# Patient Record
Sex: MALE | HISPANIC OR LATINO | Employment: FULL TIME | URBAN - NONMETROPOLITAN AREA
[De-identification: names, ages, dates, MRNs, and addresses within clinical notes are randomized per-mention and may not be internally consistent; named-entity substitution may affect disease eponyms.]

---

## 2021-05-11 ENCOUNTER — IMMUNIZATION (OUTPATIENT)
Dept: VACCINE CLINIC | Facility: HOSPITAL | Age: 51
End: 2021-05-11

## 2021-05-11 PROCEDURE — 91300 HC SARSCOV02 VAC 30MCG/0.3ML IM: CPT | Performed by: INTERNAL MEDICINE

## 2021-05-11 PROCEDURE — 0001A: CPT | Performed by: INTERNAL MEDICINE

## 2021-06-01 ENCOUNTER — IMMUNIZATION (OUTPATIENT)
Dept: VACCINE CLINIC | Facility: HOSPITAL | Age: 51
End: 2021-06-01

## 2021-06-01 PROCEDURE — 91300 HC SARSCOV02 VAC 30MCG/0.3ML IM: CPT | Performed by: INTERNAL MEDICINE

## 2021-06-01 PROCEDURE — 0002A: CPT | Performed by: INTERNAL MEDICINE

## 2021-10-27 ENCOUNTER — APPOINTMENT (OUTPATIENT)
Dept: GENERAL RADIOLOGY | Facility: HOSPITAL | Age: 51
End: 2021-10-27

## 2021-10-27 ENCOUNTER — HOSPITAL ENCOUNTER (EMERGENCY)
Facility: HOSPITAL | Age: 51
Discharge: HOME | End: 2021-10-27
Attending: EMERGENCY MEDICINE | Admitting: EMERGENCY MEDICINE

## 2021-10-27 VITALS
OXYGEN SATURATION: 98 % | SYSTOLIC BLOOD PRESSURE: 130 MMHG | DIASTOLIC BLOOD PRESSURE: 90 MMHG | RESPIRATION RATE: 16 BRPM | HEIGHT: 74 IN | BODY MASS INDEX: 29.26 KG/M2 | HEART RATE: 50 BPM | TEMPERATURE: 96.9 F | WEIGHT: 228 LBS

## 2021-10-27 DIAGNOSIS — R07.89 ATYPICAL CHEST PAIN: Primary | ICD-10-CM

## 2021-10-27 LAB
ALBUMIN SERPL-MCNC: 4.3 G/DL (ref 3.5–5.2)
ALBUMIN/GLOB SERPL: 1.7 G/DL
ALP SERPL-CCNC: 100 U/L (ref 39–117)
ALT SERPL W P-5'-P-CCNC: 21 U/L (ref 1–41)
ANION GAP SERPL CALCULATED.3IONS-SCNC: 7.9 MMOL/L (ref 5–15)
AST SERPL-CCNC: 24 U/L (ref 1–40)
BASOPHILS # BLD AUTO: 0.01 10*3/MM3 (ref 0–0.2)
BASOPHILS NFR BLD AUTO: 0.1 % (ref 0–1.5)
BILIRUB SERPL-MCNC: 0.3 MG/DL (ref 0–1.2)
BUN SERPL-MCNC: 13 MG/DL (ref 6–20)
BUN/CREAT SERPL: 10.2 (ref 7–25)
CALCIUM SPEC-SCNC: 8.9 MG/DL (ref 8.6–10.5)
CHLORIDE SERPL-SCNC: 107 MMOL/L (ref 98–107)
CO2 SERPL-SCNC: 26.1 MMOL/L (ref 22–29)
CREAT SERPL-MCNC: 1.28 MG/DL (ref 0.76–1.27)
D DIMER PPP FEU-MCNC: <0.27 MCGFEU/ML (ref 0–0.49)
DEPRECATED RDW RBC AUTO: 43.9 FL (ref 37–54)
EOSINOPHIL # BLD AUTO: 0.02 10*3/MM3 (ref 0–0.4)
EOSINOPHIL NFR BLD AUTO: 0.3 % (ref 0.3–6.2)
ERYTHROCYTE [DISTWIDTH] IN BLOOD BY AUTOMATED COUNT: 13.1 % (ref 12.3–15.4)
GFR SERPL CREATININE-BSD FRML MDRD: 59 ML/MIN/1.73
GFR SERPL CREATININE-BSD FRML MDRD: 72 ML/MIN/1.73
GLOBULIN UR ELPH-MCNC: 2.6 GM/DL
GLUCOSE SERPL-MCNC: 100 MG/DL (ref 65–99)
HCT VFR BLD AUTO: 43.8 % (ref 37.5–51)
HGB BLD-MCNC: 15.4 G/DL (ref 13–17.7)
IMM GRANULOCYTES # BLD AUTO: 0.01 10*3/MM3 (ref 0–0.05)
IMM GRANULOCYTES NFR BLD AUTO: 0.1 % (ref 0–0.5)
LIPASE SERPL-CCNC: 48 U/L (ref 13–60)
LYMPHOCYTES # BLD AUTO: 2.07 10*3/MM3 (ref 0.7–3.1)
LYMPHOCYTES NFR BLD AUTO: 30.2 % (ref 19.6–45.3)
MCH RBC QN AUTO: 32.1 PG (ref 26.6–33)
MCHC RBC AUTO-ENTMCNC: 35.2 G/DL (ref 31.5–35.7)
MCV RBC AUTO: 91.3 FL (ref 79–97)
MONOCYTES # BLD AUTO: 0.37 10*3/MM3 (ref 0.1–0.9)
MONOCYTES NFR BLD AUTO: 5.4 % (ref 5–12)
NEUTROPHILS NFR BLD AUTO: 4.37 10*3/MM3 (ref 1.7–7)
NEUTROPHILS NFR BLD AUTO: 63.9 % (ref 42.7–76)
NRBC BLD AUTO-RTO: 0 /100 WBC (ref 0–0.2)
PLATELET # BLD AUTO: 280 10*3/MM3 (ref 140–450)
PMV BLD AUTO: 10.9 FL (ref 6–12)
POTASSIUM SERPL-SCNC: 4.1 MMOL/L (ref 3.5–5.2)
PROT SERPL-MCNC: 6.9 G/DL (ref 6–8.5)
QT INTERVAL: 394 MS
RBC # BLD AUTO: 4.8 10*6/MM3 (ref 4.14–5.8)
SODIUM SERPL-SCNC: 141 MMOL/L (ref 136–145)
TROPONIN T SERPL-MCNC: <0.01 NG/ML (ref 0–0.03)
WBC # BLD AUTO: 6.85 10*3/MM3 (ref 3.4–10.8)

## 2021-10-27 PROCEDURE — 93005 ELECTROCARDIOGRAM TRACING: CPT | Performed by: EMERGENCY MEDICINE

## 2021-10-27 PROCEDURE — 85025 COMPLETE CBC W/AUTO DIFF WBC: CPT | Performed by: EMERGENCY MEDICINE

## 2021-10-27 PROCEDURE — 93010 ELECTROCARDIOGRAM REPORT: CPT | Performed by: INTERNAL MEDICINE

## 2021-10-27 PROCEDURE — 85379 FIBRIN DEGRADATION QUANT: CPT | Performed by: EMERGENCY MEDICINE

## 2021-10-27 PROCEDURE — 93005 ELECTROCARDIOGRAM TRACING: CPT

## 2021-10-27 PROCEDURE — 80053 COMPREHEN METABOLIC PANEL: CPT | Performed by: EMERGENCY MEDICINE

## 2021-10-27 PROCEDURE — 84484 ASSAY OF TROPONIN QUANT: CPT | Performed by: EMERGENCY MEDICINE

## 2021-10-27 PROCEDURE — 71045 X-RAY EXAM CHEST 1 VIEW: CPT

## 2021-10-27 PROCEDURE — 83690 ASSAY OF LIPASE: CPT | Performed by: EMERGENCY MEDICINE

## 2021-10-27 PROCEDURE — 99283 EMERGENCY DEPT VISIT LOW MDM: CPT

## 2021-10-27 RX ORDER — SODIUM CHLORIDE 0.9 % (FLUSH) 0.9 %
10 SYRINGE (ML) INJECTION AS NEEDED
Status: DISCONTINUED | OUTPATIENT
Start: 2021-10-27 | End: 2021-10-27 | Stop reason: HOSPADM

## 2024-01-18 ENCOUNTER — OFFICE VISIT (OUTPATIENT)
Dept: PRIMARY CARE | Facility: CLINIC | Age: 54
End: 2024-01-18
Payer: COMMERCIAL

## 2024-01-18 VITALS
OXYGEN SATURATION: 97 % | BODY MASS INDEX: 30.42 KG/M2 | TEMPERATURE: 97.3 F | HEIGHT: 74 IN | SYSTOLIC BLOOD PRESSURE: 122 MMHG | DIASTOLIC BLOOD PRESSURE: 86 MMHG | RESPIRATION RATE: 15 BRPM | HEART RATE: 63 BPM | WEIGHT: 237 LBS

## 2024-01-18 DIAGNOSIS — Z12.11 COLON CANCER SCREENING: ICD-10-CM

## 2024-01-18 DIAGNOSIS — Z13.220 LIPID SCREENING: ICD-10-CM

## 2024-01-18 DIAGNOSIS — N52.9 ERECTILE DYSFUNCTION, UNSPECIFIED ERECTILE DYSFUNCTION TYPE: ICD-10-CM

## 2024-01-18 DIAGNOSIS — R20.2 PARESTHESIA: ICD-10-CM

## 2024-01-18 DIAGNOSIS — Z00.00 ROUTINE GENERAL MEDICAL EXAMINATION AT A HEALTH CARE FACILITY: Primary | ICD-10-CM

## 2024-01-18 PROCEDURE — 99386 PREV VISIT NEW AGE 40-64: CPT | Performed by: STUDENT IN AN ORGANIZED HEALTH CARE EDUCATION/TRAINING PROGRAM

## 2024-01-18 PROCEDURE — 99203 OFFICE O/P NEW LOW 30 MIN: CPT | Mod: 25 | Performed by: STUDENT IN AN ORGANIZED HEALTH CARE EDUCATION/TRAINING PROGRAM

## 2024-01-18 PROCEDURE — 3008F BODY MASS INDEX DOCD: CPT | Performed by: STUDENT IN AN ORGANIZED HEALTH CARE EDUCATION/TRAINING PROGRAM

## 2024-01-18 ASSESSMENT — PAIN SCALES - GENERAL: PAINLEVEL: 0-NO PAIN

## 2024-01-18 ASSESSMENT — PATIENT HEALTH QUESTIONNAIRE - PHQ9: SUM OF ALL RESPONSES TO PHQ9 QUESTIONS 1 & 2: 0

## 2024-01-18 NOTE — PROGRESS NOTES
[facility]     CHIEF COMPLAINT   New Patient  Previous PCP: Dr. Bobby Yanez     HISTORY OF PRESENT ILLNESS      This is a 53 y.o. male who presents to establish care.  Today, patient primarily wishes to discuss erectile dysfunction.  He states that this began approximately 2 to 3 months ago.  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  He states that he previously was able to achieve nocturnal tumescence however this has since abated during the same period of time.  He states that he additionally has noted decreased libido over the last 1 to 2 months however does not believe this to be the primary issue, and rather secondary due to his erectile dysfunction.  He denies depression/anxiety.  Denies alcohol use.  He states he did try 1 pill of Viagra however this did not provide any benefit to him.    PAST MEDICAL AND SURGICAL HISTORY      History reviewed. No pertinent past medical history.    History reviewed. No pertinent surgical history.    MEDICATIONS      No current outpatient medications on file.    ALLERGIES      Patient has no known allergies.    FAMILY HISTORY      Family History   Problem Relation Age of Onset   • Hypertension Biological Mother    • No Known Problems Biological Father    • Prostate cancer Maternal Grandfather        SOCIAL HISTORY      Social History     Socioeconomic History   • Marital status:      Spouse name: None   • Number of children: None   • Years of education: None   • Highest education level: None   Occupational History   • Occupation:    Tobacco Use   • Smoking status: Never     Passive exposure: Never   • Smokeless tobacco: Never   Vaping Use   • Vaping Use: Never used   Substance and Sexual Activity   • Alcohol use: Never   • Drug use:  "Never   • Sexual activity: Yes     Partners: Female       REVIEW OF SYSTEMS      Review of Systems   All other systems reviewed and are negative.      PHYSICAL EXAMINATION      Visit Vitals  /86   Pulse 63   Temp 36.3 °C (97.3 °F) (Temporal)   Resp 15   Ht 1.88 m (6' 2\")   Wt 108 kg (237 lb) Comment: with sneakers on   SpO2 97%   BMI 30.43 kg/m²     Body mass index is 30.43 kg/m².    Physical Exam  Constitutional:       General: He is not in acute distress.     Appearance: He is well-developed. He is not toxic-appearing or diaphoretic.   HENT:      Head: Normocephalic and atraumatic.   Eyes:      Extraocular Movements: Extraocular movements intact.      Pupils: Pupils are equal, round, and reactive to light.   Cardiovascular:      Rate and Rhythm: Normal rate and regular rhythm.      Heart sounds: Normal heart sounds. No murmur heard.     No friction rub. No gallop.   Pulmonary:      Effort: Pulmonary effort is normal. No respiratory distress.      Breath sounds: Normal breath sounds. No wheezing or rales.   Musculoskeletal:         General: No deformity. Normal range of motion.      Cervical back: Normal range of motion and neck supple.      Right lower leg: No edema.      Left lower leg: No edema.   Skin:     General: Skin is warm and dry.      Coloration: Skin is not jaundiced.      Findings: No rash.   Neurological:      General: No focal deficit present.      Mental Status: He is alert and oriented to person, place, and time.   Psychiatric:         Mood and Affect: Mood normal.         Behavior: Behavior normal.         LABS / IMAGING / STUDIES        Labs    No results found for: \"CREATININE\"  No results found for: \"WBC\", \"HGB\", \"HCT\", \"MCV\", \"PLT\"      No results found for: \"HGBA1C\"  No results found for: \"MICROALBUR\", \"[id number]\"        HEALTH MAINTENANCE           Colonoscopy: Due, referred    Tdap: Counseled  Flu: Counseled  COVID-19: Counseled    Labs: Due, ordered    Dentist: UTD    Diet: " Balanced  Exercise: Regular       ASSESSMENT AND PLAN   Problem List Items Addressed This Visit        Genitourinary    Erectile dysfunction     Unclear if structural/lumbar/sacral impingement versus primary hormonal.  Will order blood work to further evaluate.  Will additionally order lumbar and sacral x-rays for further evaluation.         Relevant Orders    TSH w reflex FT4    Testosterone, total and free       Other    Routine general medical examination at a health care facility - Primary     Patient is up-to-date with age-appropriate cancer screenings and vaccinations unless otherwise noted above.  Counseled regarding importance of keeping up-to-date with vaccinations and cancer screenings as applicable.  Baseline/repeat/annual labs ordered.  RTO 1 year or sooner PRN.           Relevant Orders    CBC and differential    Comprehensive metabolic panel    Lipid panel    Hemoglobin A1c   Other Visit Diagnoses     Lipid screening        Relevant Orders    Lipid panel    Paresthesia        Relevant Orders    X-RAY LUMBAR SPINE COMPLETE 4+ VIEWS    X-RAY SACRUM AND COCCYX    Colon cancer screening        Relevant Orders    Ambulatory referral to Gastroenterology          Health Care Maintenance (discussed if applicable):    Patient encouraged to increase activity gradually as tolerated with a goal of 150 minutes of aerobic activity per week via AVS.     Counseled patient on healthy eating (high quality, low carb, low sugar diet) via AVS.     Advised patient to complete regular dental examinations, brushing and flossing daily via AVS.    Encouraged enhanced safety by wearing seat belts, checking smoke and CO detectors via AVS.            Brian Mart, DO  01/18/24        Some or all of this note has been written using voice recognition software.    Please excuse any typographical errors.    On date of encounter, 30 minutes were spent exclusively dedicated to this encounter including pre-visit chart review and  documentation, patient interview and physical examination, and post-visit documentation.

## 2024-01-18 NOTE — ASSESSMENT & PLAN NOTE
Unclear if structural/lumbar/sacral impingement versus primary hormonal.  Will order blood work to further evaluate.  Will additionally order lumbar and sacral x-rays for further evaluation.

## 2024-01-26 ENCOUNTER — HOSPITAL ENCOUNTER (OUTPATIENT)
Dept: RADIOLOGY | Age: 54
Discharge: HOME | End: 2024-01-26
Attending: STUDENT IN AN ORGANIZED HEALTH CARE EDUCATION/TRAINING PROGRAM
Payer: COMMERCIAL

## 2024-01-26 DIAGNOSIS — R20.2 PARESTHESIA: ICD-10-CM

## 2024-01-26 PROCEDURE — 72220 X-RAY EXAM SACRUM TAILBONE: CPT

## 2024-01-26 PROCEDURE — 72110 X-RAY EXAM L-2 SPINE 4/>VWS: CPT
